# Patient Record
Sex: FEMALE | Race: WHITE | NOT HISPANIC OR LATINO | URBAN - METROPOLITAN AREA
[De-identification: names, ages, dates, MRNs, and addresses within clinical notes are randomized per-mention and may not be internally consistent; named-entity substitution may affect disease eponyms.]

---

## 2017-03-28 ENCOUNTER — OFFICE (OUTPATIENT)
Dept: URBAN - METROPOLITAN AREA CLINIC 10 | Facility: CLINIC | Age: 72
End: 2017-03-28
Payer: MEDICARE

## 2017-03-28 VITALS
HEIGHT: 63 IN | DIASTOLIC BLOOD PRESSURE: 62 MMHG | WEIGHT: 191 LBS | HEART RATE: 74 BPM | SYSTOLIC BLOOD PRESSURE: 128 MMHG

## 2017-03-28 DIAGNOSIS — R11.0 NAUSEA: ICD-10-CM

## 2017-03-28 DIAGNOSIS — K30 FUNCTIONAL DYSPEPSIA: ICD-10-CM

## 2017-03-28 DIAGNOSIS — R19.7 DIARRHEA, UNSPECIFIED: ICD-10-CM

## 2017-03-28 PROCEDURE — G8427 DOCREV CUR MEDS BY ELIG CLIN: HCPCS | Performed by: INTERNAL MEDICINE

## 2017-03-28 PROCEDURE — 99213 OFFICE O/P EST LOW 20 MIN: CPT | Performed by: INTERNAL MEDICINE

## 2017-03-28 RX ORDER — DICYCLOMINE HYDROCHLORIDE 10 MG/1
CAPSULE ORAL
Qty: 60 | Refills: 0 | Status: COMPLETED
Start: 2017-03-28 | End: 2017-10-04

## 2017-10-04 ENCOUNTER — OFFICE (OUTPATIENT)
Dept: URBAN - METROPOLITAN AREA CLINIC 10 | Facility: CLINIC | Age: 72
End: 2017-10-04
Payer: MEDICARE

## 2017-10-04 VITALS
HEIGHT: 63 IN | HEART RATE: 66 BPM | SYSTOLIC BLOOD PRESSURE: 113 MMHG | WEIGHT: 189 LBS | DIASTOLIC BLOOD PRESSURE: 62 MMHG

## 2017-10-04 DIAGNOSIS — K59.00 CONSTIPATION, UNSPECIFIED: ICD-10-CM

## 2017-10-04 DIAGNOSIS — E66.9 OBESITY, UNSPECIFIED: ICD-10-CM

## 2017-10-04 LAB
BASIC METABOLIC PANEL (8): BUN/CREATININE RATIO: 13 (ref 12–28)
BASIC METABOLIC PANEL (8): BUN: 8 MG/DL (ref 8–27)
BASIC METABOLIC PANEL (8): CALCIUM, SERUM: 9.5 MG/DL (ref 8.7–10.3)
BASIC METABOLIC PANEL (8): CARBON DIOXIDE, TOTAL: 24 MMOL/L (ref 18–29)
BASIC METABOLIC PANEL (8): CHLORIDE, SERUM: 104 MMOL/L (ref 96–106)
BASIC METABOLIC PANEL (8): CREATININE, SERUM: 0.61 MG/DL (ref 0.57–1)
BASIC METABOLIC PANEL (8): EGFR IF AFRICN AM: 104 ML/MIN/1.73 (ref 59–?)
BASIC METABOLIC PANEL (8): EGFR IF NONAFRICN AM: 90 ML/MIN/1.73 (ref 59–?)
BASIC METABOLIC PANEL (8): GLUCOSE, SERUM: 90 MG/DL (ref 65–99)
BASIC METABOLIC PANEL (8): POTASSIUM, SERUM: 4.7 MMOL/L (ref 3.5–5.2)
BASIC METABOLIC PANEL (8): SODIUM, SERUM: 141 MMOL/L (ref 134–144)
TSH: 1.02 UIU/ML (ref 0.45–4.5)

## 2017-10-04 PROCEDURE — G8427 DOCREV CUR MEDS BY ELIG CLIN: HCPCS

## 2017-10-04 PROCEDURE — 99214 OFFICE O/P EST MOD 30 MIN: CPT

## 2017-10-04 RX ORDER — DOCUSATE SODIUM 100 MG/1
CAPSULE ORAL
Qty: 60 | Refills: 6 | Status: COMPLETED
Start: 2017-10-04 | End: 2018-07-30

## 2018-07-30 ENCOUNTER — OFFICE (OUTPATIENT)
Dept: URBAN - METROPOLITAN AREA CLINIC 10 | Facility: CLINIC | Age: 73
End: 2018-07-30

## 2018-07-30 VITALS
DIASTOLIC BLOOD PRESSURE: 64 MMHG | HEIGHT: 63 IN | WEIGHT: 189 LBS | HEART RATE: 66 BPM | SYSTOLIC BLOOD PRESSURE: 125 MMHG

## 2018-07-30 DIAGNOSIS — R10.31 RIGHT LOWER QUADRANT PAIN: ICD-10-CM

## 2018-07-30 DIAGNOSIS — K59.00 CONSTIPATION, UNSPECIFIED: ICD-10-CM

## 2018-07-30 LAB
CBC, PLATELET, NO DIFFERENTIAL: HEMATOCRIT: 43.2 % (ref 34–46.6)
CBC, PLATELET, NO DIFFERENTIAL: HEMOGLOBIN: 14.1 G/DL (ref 11.1–15.9)
CBC, PLATELET, NO DIFFERENTIAL: MCH: 29.6 PG (ref 26.6–33)
CBC, PLATELET, NO DIFFERENTIAL: MCHC: 32.6 G/DL (ref 31.5–35.7)
CBC, PLATELET, NO DIFFERENTIAL: MCV: 91 FL (ref 79–97)
CBC, PLATELET, NO DIFFERENTIAL: NRBC: (no result)
CBC, PLATELET, NO DIFFERENTIAL: PLATELETS: 211 X10E3/UL (ref 150–379)
CBC, PLATELET, NO DIFFERENTIAL: RBC: 4.77 X10E6/UL (ref 3.77–5.28)
CBC, PLATELET, NO DIFFERENTIAL: RDW: 14.1 % (ref 12.3–15.4)
CBC, PLATELET, NO DIFFERENTIAL: WBC: 6.1 X10E3/UL (ref 3.4–10.8)

## 2018-07-30 PROCEDURE — 99213 OFFICE O/P EST LOW 20 MIN: CPT | Performed by: INTERNAL MEDICINE

## 2018-07-30 RX ORDER — POLYETHYLENE GLYCOL 3350, SODIUM SULFATE ANHYDROUS, SODIUM BICARBONATE, SODIUM CHLORIDE, POTASSIUM CHLORIDE 236; 22.74; 6.74; 5.86; 2.97 G/4L; G/4L; G/4L; G/4L; G/4L
POWDER, FOR SOLUTION ORAL
Qty: 1 | Refills: 0 | Status: ACTIVE
Start: 2018-07-30

## 2018-07-30 RX ORDER — LINACLOTIDE 145 UG/1
CAPSULE, GELATIN COATED ORAL
Qty: 90 | Refills: 3 | Status: ACTIVE
Start: 2018-07-30

## 2018-07-30 NOTE — SERVICEHPINOTES
Ms. Calloway is 72 years old. She is here for follow-up with chief complaint of bloating and abdominal pain. She complains of constipation as well. She also complains of RLQ pain that feels as if a mass that is pulling and sensation is worse with rolling over on right side or with standing. She describes a stool frequency of 4-5 days/ week if "I take everything". Everything appears to be MOM, linzess, amitiza, and some other OTC remedies. She describes her stool as water. Linzess 290 makes her go too often. She uses enemas with no results. She will take one laxative regimen one day and then change it up the following day if she does not get the results she thinks she should. She denies blood in her stool. 11/2016–EGD for epigastric pain and dysphagia: Tortuous esophagus but no mechanical obstruction. Biopsies of the antrum and body negative for H. pylori. Fundic gland polyps. Gastric cardia hyperplastic polyp ×2 and antral hyperplastic polyp. Biopsies for eosinophilic esophagitis negative.-Colonoscopy for personal history of subcentimeter tubular adenoma in 2011 and a family history of colon polyps: Internal hemorrhoids. Melanosis coli. Left-sided diverticulosis of moderate severity. 3 subcentimeter transverse colon tubular adenomas. Subcentimeter ascending colon tubular adenoma. Rectal leiomyoma.Her mother and father had colon cancer. She does not remember their ages.10/2014–CT scan of the abdomen and pelvis with IV contrast: Mild sigmoid diverticulitis, bilateral adrenal nodules with the largest being 1.7 cm on the left. 1.4 cm nodule on the right.-EGD for abdominal pain: Multiple polyps of the stomach body with all the subcentimeter. Gastric pH 5. Biopsies of gastric polyps again consistent with fundic gland polyps and hyperplastic polyps.9/2011–EGD for abdominal pain: Normal esophagus and duodenum. Hyperplastic and fundic gland polyps of the stomach body-Colonoscopy for personal history of colon polyps as well as family history of colon cancer: Sub-centimeters ascending tubular adenoma. Melanosis coli.

## 2018-08-08 ENCOUNTER — OFFICE (OUTPATIENT)
Dept: URBAN - METROPOLITAN AREA CLINIC 10 | Facility: CLINIC | Age: 73
End: 2018-08-08

## 2018-11-29 ENCOUNTER — OFFICE (OUTPATIENT)
Dept: URBAN - METROPOLITAN AREA CLINIC 10 | Facility: CLINIC | Age: 73
End: 2018-11-29

## 2018-11-29 VITALS
SYSTOLIC BLOOD PRESSURE: 131 MMHG | WEIGHT: 189 LBS | HEIGHT: 63 IN | DIASTOLIC BLOOD PRESSURE: 63 MMHG | HEART RATE: 74 BPM

## 2018-11-29 DIAGNOSIS — K59.00 CONSTIPATION, UNSPECIFIED: ICD-10-CM

## 2018-11-29 DIAGNOSIS — N81.6 RECTOCELE: ICD-10-CM

## 2018-11-29 PROCEDURE — 99213 OFFICE O/P EST LOW 20 MIN: CPT | Performed by: INTERNAL MEDICINE
